# Patient Record
Sex: MALE | Race: WHITE | Employment: FULL TIME | URBAN - METROPOLITAN AREA
[De-identification: names, ages, dates, MRNs, and addresses within clinical notes are randomized per-mention and may not be internally consistent; named-entity substitution may affect disease eponyms.]

---

## 2021-12-04 ENCOUNTER — OFFICE VISIT (OUTPATIENT)
Dept: URGENT CARE | Facility: URGENT CARE | Age: 51
End: 2021-12-04
Payer: COMMERCIAL

## 2021-12-04 VITALS
HEART RATE: 78 BPM | WEIGHT: 224.6 LBS | OXYGEN SATURATION: 96 % | DIASTOLIC BLOOD PRESSURE: 89 MMHG | SYSTOLIC BLOOD PRESSURE: 145 MMHG | BODY MASS INDEX: 31.33 KG/M2 | TEMPERATURE: 98.6 F

## 2021-12-04 DIAGNOSIS — E11.65 UNCONTROLLED TYPE 2 DIABETES MELLITUS WITH HYPERGLYCEMIA (H): ICD-10-CM

## 2021-12-04 DIAGNOSIS — R05.9 COUGH: Primary | ICD-10-CM

## 2021-12-04 DIAGNOSIS — R03.0 ELEVATED BLOOD PRESSURE READING WITHOUT DIAGNOSIS OF HYPERTENSION: ICD-10-CM

## 2021-12-04 DIAGNOSIS — J01.90 ACUTE SINUSITIS WITH COEXISTING CONDITION REQUIRING PROPHYLACTIC TREATMENT: ICD-10-CM

## 2021-12-04 PROCEDURE — U0003 INFECTIOUS AGENT DETECTION BY NUCLEIC ACID (DNA OR RNA); SEVERE ACUTE RESPIRATORY SYNDROME CORONAVIRUS 2 (SARS-COV-2) (CORONAVIRUS DISEASE [COVID-19]), AMPLIFIED PROBE TECHNIQUE, MAKING USE OF HIGH THROUGHPUT TECHNOLOGIES AS DESCRIBED BY CMS-2020-01-R: HCPCS | Performed by: FAMILY MEDICINE

## 2021-12-04 PROCEDURE — 99204 OFFICE O/P NEW MOD 45 MIN: CPT | Performed by: FAMILY MEDICINE

## 2021-12-04 PROCEDURE — U0005 INFEC AGEN DETEC AMPLI PROBE: HCPCS | Performed by: FAMILY MEDICINE

## 2021-12-04 RX ORDER — ATORVASTATIN CALCIUM 40 MG/1
40 TABLET, FILM COATED ORAL DAILY
COMMUNITY
Start: 2021-11-11

## 2021-12-04 RX ORDER — MULTIPLE VITAMINS W/ MINERALS TAB 9MG-400MCG
1 TAB ORAL DAILY
COMMUNITY

## 2021-12-04 NOTE — PROGRESS NOTES
Chief complaint: cough    A week now     BP elevated today but asymptomatic. No headache no blurring of vision no chest pain no shortness of breath no dizziness no unsteadiness no numbness no weakness    Cough  Now more in the sinus pressure    Fever No  Sinus congestion/sinus pain Yes  Wheezing: No  Chest pain or exertional shortness of breath: NO   Exposure to pertussis or pertussis like symptoms: No  Orthopnea, worsening edema, pnd: NO  Rash: NO  Tried OTC medications without relief  No hemoptysis.  Worsening symptoms hence patient came in to be seen     Problem list and histories reviewed & adjusted, as indicated.  Additional history: as documented    Problem list, Medication list, Allergies, and Medical/Social/Surgical histories reviewed in EPIC and updated as appropriate.    ROS:  Constitutional, HEENT, cardiovascular, pulmonary, gi and gu systems are negative, except as otherwise noted.    OBJECTIVE:                                                    BP (!) 145/89   Pulse 78   Temp 98.6  F (37  C) (Tympanic)   Wt 101.9 kg (224 lb 9.6 oz)   SpO2 96%   BMI 31.33 kg/m    Body mass index is 31.33 kg/m .  GENERAL: healthy, alert and no distress  EYES: pink palpebral conjunctiva, anicteric sclera  ENT: midline nasal septum normal ear exam. Maxillary sinus tenderness and frontal sinus tenderness  sinuses.   Mouth: moist buccal mucosa nonhyperemic posterior pharyngeal wall. No tonsillar enlargement or cellulitis  NECK: no adenopathy, no asymmetry, masses, or scars and thyroid normal to palpation  RESP: lungs clear to auscultation - No  rales, rhonchi or wheezes    CV: regular rate and rhythm, normal S1 S2, no S3 or S4,  No murmurs, click or rub  SKIN: no visible rashes noted  Pscyh: Appropriate mood and affect  MS: no gross musculoskeletal defects noted    Diagnostic Test Results:  none      ASSESSMENT/PLAN:                                                        ICD-10-CM    1. Cough  R05.9 Symptomatic COVID-19  Virus (Coronavirus) by PCR Nose         ICD-10-CM    1. Cough  R05.9 Symptomatic COVID-19 Virus (Coronavirus) by PCR Nose   2. Acute sinusitis with coexisting condition requiring prophylactic treatment  J01.90 amoxicillin-clavulanate (AUGMENTIN) 875-125 MG tablet   3. Uncontrolled type 2 diabetes mellitus with hyperglycemia (H)  E11.65    4. Elevated blood pressure reading without diagnosis of hypertension  R03.0      Prescribed with augmentin  Side effects discussed warned about GI side effects and risk of cdiff.  Follow up with primary care provider recommended for DM and elevated bp  chest xray, offered today, declined.   Adverse reactions of medications discussed.  Over the counter medications discussed.   Aware to come back in if with worsening symptoms or if no relief despite treatment plan  Patient voiced understanding and had no further questions.     Your blood pressure reading was elevated today.  Recommend that you have it re-checked either at home or at our clinic within a week  If persistently elevated top number 140 and above, or the bottom number 90 and above, please schedule an appointment to see a provider in clinic within a week.  If you have very elevated blood pressures, accompanied by headache, chest pain, numbness, weakness, slurring of speech, confusion, difficulty walking, call 911 and go to the ER      MD Digna Banda MD  [facility] [place]

## 2021-12-05 LAB — SARS-COV-2 RNA RESP QL NAA+PROBE: NEGATIVE

## 2021-12-14 ENCOUNTER — ANCILLARY PROCEDURE (OUTPATIENT)
Dept: GENERAL RADIOLOGY | Facility: CLINIC | Age: 51
End: 2021-12-14
Attending: FAMILY MEDICINE
Payer: COMMERCIAL

## 2021-12-14 ENCOUNTER — OFFICE VISIT (OUTPATIENT)
Dept: URGENT CARE | Facility: URGENT CARE | Age: 51
End: 2021-12-14
Payer: COMMERCIAL

## 2021-12-14 VITALS
HEART RATE: 73 BPM | OXYGEN SATURATION: 96 % | SYSTOLIC BLOOD PRESSURE: 150 MMHG | TEMPERATURE: 97.9 F | BODY MASS INDEX: 32.25 KG/M2 | WEIGHT: 231.2 LBS | DIASTOLIC BLOOD PRESSURE: 92 MMHG

## 2021-12-14 DIAGNOSIS — M25.511 ACUTE PAIN OF RIGHT SHOULDER: ICD-10-CM

## 2021-12-14 DIAGNOSIS — M25.511 ACUTE PAIN OF RIGHT SHOULDER: Primary | ICD-10-CM

## 2021-12-14 PROCEDURE — 73030 X-RAY EXAM OF SHOULDER: CPT | Mod: RT | Performed by: RADIOLOGY

## 2021-12-14 PROCEDURE — 99214 OFFICE O/P EST MOD 30 MIN: CPT | Performed by: FAMILY MEDICINE

## 2021-12-14 RX ORDER — CYCLOBENZAPRINE HCL 10 MG
10 TABLET ORAL 3 TIMES DAILY PRN
Qty: 20 TABLET | Refills: 0 | Status: SHIPPED | OUTPATIENT
Start: 2021-12-14

## 2021-12-14 NOTE — LETTER
December 14, 2021      Red Rodriguez  [address] [facility] [address]        To Whom It May Concern:    Red DOWD Jennifer was seen in our clinic please excuse him from work 12/15-12/17/21 so that he may recover should his symptoms not resolve he is asked to meet with sports medicine      Sincerely,        Jah Fountain MD

## 2021-12-14 NOTE — PATIENT INSTRUCTIONS
Tylenol 325-650mg and/or ibuprofen 400-600mg every 4-6 hours for discomfort      Massage intermittently      Heat packs every 1-2 hours       Muscle relaxant: flexeril every 8 hours as needed      If symptoms worsen ( weakness, pain, numbness) please seek medical attention right away      If no significant improvement in next 2-3 days call sports medicine to make an appointment Call [phone]

## 2021-12-14 NOTE — PROGRESS NOTES
Assessment & Plan     Acute pain of right shoulder  - XR Shoulder Right G/E 3 Views  - cyclobenzaprine (FLEXERIL) 10 MG tablet  Dispense: 20 tablet; Refill: 0       Presumed muscle spasms of shoulder muscles - trial of analgesics , rest, muscle relaxants, massage, and heat. F/u with sports medicine in 3-5 days if no improvement. Letter provided to relieve of work duties given this affecting his dominant side. . Ddx includes adhesive capsulitis/impingement syndrome  No mechanism to suggest a pull/injury of the brachial plexus  See AVS summary for additional recommendations reviewed with patient during this visit.     Jah Fountain MD   [facility]    Katya Moon is a 51 year old male who presents to clinic today for the following health issues:  Chief Complaint   Patient presents with     Shoulder Pain     Says right shoulder has been hurting for last 3 days. Has had no injury. Pain is radiating down to the fingers.      HPI    No hx of neck injuries  Denies numbness in his right arm  No hx of playing sports  , no heavy lifting    Sudden onset discomfort R shoulder anterior starting 2 days ago. Wife has tried massaging. No heavy lifting. No trauma. No hx of shoulder fracture.         Patient Active Problem List    Diagnosis Date Noted     Uncontrolled type 2 diabetes mellitus with hyperglycemia (H) 08/14/2020     Priority: Medium       Current Outpatient Medications   Medication     ASPIRIN ADULT LOW STRENGTH PO     atorvastatin (LIPITOR) 40 MG tablet     cyclobenzaprine (FLEXERIL) 10 MG tablet     metFORMIN (GLUCOPHAGE) 1000 MG tablet     metoprolol (TOPROL-XL) 12.5 MG TABS     multivitamin w/minerals (MULTIVITAMIN, THERAPEUTIC WITH MINERALS) tablet     Omega-3 Fatty Acids (OMEGA-3 FISH OIL PO)     simvastatin (ZOCOR) 40 MG tablet     hydrocodone-acetaminophen (VICODIN) 5-500 MG per tablet     No current facility-administered medications for this visit.           Objective    BP  (!) 150/92   Pulse 73   Temp 97.9  F (36.6  C) (Tympanic)   Wt 104.9 kg (231 lb 3.2 oz)   SpO2 96%   BMI 32.25 kg/m    Physical Exam     R shoulder: passive/active ROM limited to 70 degrees due to anterior shoulder pain, no step off along clavicle, belly liftoff unremarkable, prater carlos unremarkable, unable to actively abduct or forward flex beyond 50-60 degrees without pain. Assessment is limited due to discomfort    Results for orders placed or performed in visit on 12/14/21   XR Shoulder Right G/E 3 Views     Status: None    Narrative    EXAM: XR SHOULDER RIGHT G/E 3 VIEWS  LOCATION: [facility]  DATE/TIME: 12/14/2021 5:02 PM    INDICATION: Right anterior shoulder pain and decreased range of motion.  COMPARISON: None.      Impression    IMPRESSION: Normal right shoulder for age. 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 No evidence of glenohumeral arthropathy. No fracture or bone lesion.                      The use of Dragon/[employer] dictation services may have been used to construct the content in this note; any grammatical or spelling errors are non-intentional. Please contact the author of this note directly if you are in need of any clarification.

## 2021-12-18 ENCOUNTER — HEALTH MAINTENANCE LETTER (OUTPATIENT)
Age: 51
End: 2021-12-18

## 2022-04-09 ENCOUNTER — HEALTH MAINTENANCE LETTER (OUTPATIENT)
Age: 52
End: 2022-04-09

## 2022-07-30 ENCOUNTER — HEALTH MAINTENANCE LETTER (OUTPATIENT)
Age: 52
End: 2022-07-30

## 2022-10-10 ENCOUNTER — HEALTH MAINTENANCE LETTER (OUTPATIENT)
Age: 52
End: 2022-10-10

## 2022-11-26 ENCOUNTER — HEALTH MAINTENANCE LETTER (OUTPATIENT)
Age: 52
End: 2022-11-26

## 2023-02-18 ENCOUNTER — HEALTH MAINTENANCE LETTER (OUTPATIENT)
Age: 53
End: 2023-02-18

## 2023-03-25 ENCOUNTER — HEALTH MAINTENANCE LETTER (OUTPATIENT)
Age: 53
End: 2023-03-25

## 2023-08-09 ENCOUNTER — TELEPHONE (OUTPATIENT)
Dept: CARDIOLOGY | Facility: CLINIC | Age: 53
End: 2023-08-09
Payer: COMMERCIAL

## 2023-08-09 DIAGNOSIS — Z02.89 ENCOUNTER FOR EXAMINATION REQUIRED BY DEPARTMENT OF TRANSPORTATION (DOT): ICD-10-CM

## 2023-08-09 DIAGNOSIS — I25.10 CAD (CORONARY ARTERY DISEASE): Primary | ICD-10-CM

## 2023-08-09 NOTE — TELEPHONE ENCOUNTER
Typically patient has to establish care first.    Dr. Longo, do you want to see patient first?  He is asking for a stress echo to be ordered prior for DOT clearance.  Hx of CAD prior MI in 2011 at [facility] [place], [place], T2DM, HTN.    Merly Momin, RN  Cardiology Care Coordinator  [facility]  [phone] option 1

## 2023-08-09 NOTE — TELEPHONE ENCOUNTER
Health Call Center    Phone Message    May a detailed message be left on voicemail: yes     Reason for Call: Other: Patient called and wanted to schedule a new patient appt and echo stress test. Patient is schedule for a New Cardiology appt. Patient is requesting if Dr. Longo would please put in a order for the test. Patient has to do both appts for DOT. Please reach out to patient is ok to get the order placed.      Action Taken: Other: Cardiology     Travel Screening: Not Applicable    Thank you!  [facility]

## 2023-08-10 NOTE — TELEPHONE ENCOUNTER
Please call patient to assist scheduling a stress echo prior to the appointment with Dr. Longo.  Pt will have to hold metoprolol 24 hours prior to the testing.    Merly Momin RN  Cardiology Care Coordinator  [facility]  [phone] option 1

## 2023-08-11 NOTE — TELEPHONE ENCOUNTER
Called and spoke to patient and scheduled stress echo. Patient told by writer that he cannot eat for 3 hours prior, and cannot have alcohol, caffeine, tobacco for 12 hours prior. Writer also relayed RN message that patient needs to hold his metoprolol for 24 hours prior to the test. Patient verbalized understanding to all instructions.    FAINA Mora

## 2023-08-19 ENCOUNTER — HEALTH MAINTENANCE LETTER (OUTPATIENT)
Age: 53
End: 2023-08-19

## 2023-08-29 ENCOUNTER — ANCILLARY PROCEDURE (OUTPATIENT)
Dept: CARDIOLOGY | Facility: CLINIC | Age: 53
End: 2023-08-29
Attending: INTERNAL MEDICINE
Payer: COMMERCIAL

## 2023-08-29 DIAGNOSIS — Z02.89 ENCOUNTER FOR EXAMINATION REQUIRED BY DEPARTMENT OF TRANSPORTATION (DOT): ICD-10-CM

## 2023-08-29 DIAGNOSIS — I25.10 CAD (CORONARY ARTERY DISEASE): ICD-10-CM

## 2023-08-29 PROCEDURE — 93325 DOPPLER ECHO COLOR FLOW MAPG: CPT | Mod: TC | Performed by: INTERNAL MEDICINE

## 2023-08-29 PROCEDURE — 93018 CV STRESS TEST I&R ONLY: CPT | Performed by: STUDENT IN AN ORGANIZED HEALTH CARE EDUCATION/TRAINING PROGRAM

## 2023-08-29 PROCEDURE — 93017 CV STRESS TEST TRACING ONLY: CPT | Performed by: INTERNAL MEDICINE

## 2023-08-29 PROCEDURE — 93350 STRESS TTE ONLY: CPT | Mod: TC | Performed by: INTERNAL MEDICINE

## 2023-08-29 PROCEDURE — 93321 DOPPLER ECHO F-UP/LMTD STD: CPT | Mod: TC | Performed by: INTERNAL MEDICINE

## 2023-08-29 PROCEDURE — 93016 CV STRESS TEST SUPVJ ONLY: CPT | Performed by: INTERNAL MEDICINE

## 2023-08-29 PROCEDURE — 93352 ADMIN ECG CONTRAST AGENT: CPT | Performed by: INTERNAL MEDICINE

## 2023-08-29 PROCEDURE — 93321 DOPPLER ECHO F-UP/LMTD STD: CPT | Mod: 26 | Performed by: STUDENT IN AN ORGANIZED HEALTH CARE EDUCATION/TRAINING PROGRAM

## 2023-08-29 PROCEDURE — 93325 DOPPLER ECHO COLOR FLOW MAPG: CPT | Mod: 26 | Performed by: STUDENT IN AN ORGANIZED HEALTH CARE EDUCATION/TRAINING PROGRAM

## 2023-08-29 PROCEDURE — 93350 STRESS TTE ONLY: CPT | Mod: 26 | Performed by: STUDENT IN AN ORGANIZED HEALTH CARE EDUCATION/TRAINING PROGRAM

## 2023-08-29 PROCEDURE — 99207 PR STATISTIC IV PUSH SINGLE INITIAL SUBSTANCE: CPT | Performed by: INTERNAL MEDICINE

## 2023-08-29 RX ADMIN — Medication 5 ML: at 09:20

## 2024-01-06 ENCOUNTER — HEALTH MAINTENANCE LETTER (OUTPATIENT)
Age: 54
End: 2024-01-06

## 2024-03-16 ENCOUNTER — HEALTH MAINTENANCE LETTER (OUTPATIENT)
Age: 54
End: 2024-03-16

## 2024-05-25 ENCOUNTER — HEALTH MAINTENANCE LETTER (OUTPATIENT)
Age: 54
End: 2024-05-25

## 2024-10-12 ENCOUNTER — HEALTH MAINTENANCE LETTER (OUTPATIENT)
Age: 54
End: 2024-10-12

## 2025-01-25 ENCOUNTER — HEALTH MAINTENANCE LETTER (OUTPATIENT)
Age: 55
End: 2025-01-25

## 2025-05-03 ENCOUNTER — HEALTH MAINTENANCE LETTER (OUTPATIENT)
Age: 55
End: 2025-05-03

## 2025-08-16 ENCOUNTER — HEALTH MAINTENANCE LETTER (OUTPATIENT)
Age: 55
End: 2025-08-16